# Patient Record
Sex: MALE | Race: WHITE | Employment: OTHER | URBAN - METROPOLITAN AREA
[De-identification: names, ages, dates, MRNs, and addresses within clinical notes are randomized per-mention and may not be internally consistent; named-entity substitution may affect disease eponyms.]

---

## 2018-03-26 ENCOUNTER — OFFICE VISIT (OUTPATIENT)
Dept: FAMILY MEDICINE CLINIC | Age: 73
End: 2018-03-26
Payer: MEDICARE

## 2018-03-26 VITALS
DIASTOLIC BLOOD PRESSURE: 78 MMHG | HEIGHT: 73 IN | SYSTOLIC BLOOD PRESSURE: 129 MMHG | TEMPERATURE: 97.8 F | BODY MASS INDEX: 26.03 KG/M2 | HEART RATE: 53 BPM | WEIGHT: 196.4 LBS

## 2018-03-26 DIAGNOSIS — M25.50 ARTHRALGIA, UNSPECIFIED JOINT: ICD-10-CM

## 2018-03-26 DIAGNOSIS — H91.92 DEAF, LEFT: ICD-10-CM

## 2018-03-26 DIAGNOSIS — E55.9 VITAMIN D DEFICIENCY: ICD-10-CM

## 2018-03-26 DIAGNOSIS — K59.01 SLOW TRANSIT CONSTIPATION: ICD-10-CM

## 2018-03-26 DIAGNOSIS — R00.0 TACHYCARDIA: ICD-10-CM

## 2018-03-26 DIAGNOSIS — R73.09 LOW GLUCOSE LEVEL: ICD-10-CM

## 2018-03-26 DIAGNOSIS — E78.5 ELEVATED LIPIDS: ICD-10-CM

## 2018-03-26 DIAGNOSIS — G47.19 EXCESSIVE DAYTIME SLEEPINESS: ICD-10-CM

## 2018-03-26 DIAGNOSIS — R53.83 TIRED: ICD-10-CM

## 2018-03-26 DIAGNOSIS — K90.89 OTHER INTESTINAL MALABSORPTION (CODE): ICD-10-CM

## 2018-03-26 DIAGNOSIS — R06.02 SHORT OF BREATH ON EXERTION: ICD-10-CM

## 2018-03-26 DIAGNOSIS — C61 PROSTATE CANCER (HCC): ICD-10-CM

## 2018-03-26 DIAGNOSIS — F41.9 ANXIETY: ICD-10-CM

## 2018-03-26 DIAGNOSIS — R00.1 BRADYCARDIA: Primary | ICD-10-CM

## 2018-03-26 PROCEDURE — G8419 CALC BMI OUT NRM PARAM NOF/U: HCPCS | Performed by: FAMILY MEDICINE

## 2018-03-26 PROCEDURE — 3017F COLORECTAL CA SCREEN DOC REV: CPT | Performed by: FAMILY MEDICINE

## 2018-03-26 PROCEDURE — 4040F PNEUMOC VAC/ADMIN/RCVD: CPT | Performed by: FAMILY MEDICINE

## 2018-03-26 PROCEDURE — G8484 FLU IMMUNIZE NO ADMIN: HCPCS | Performed by: FAMILY MEDICINE

## 2018-03-26 PROCEDURE — 1036F TOBACCO NON-USER: CPT | Performed by: FAMILY MEDICINE

## 2018-03-26 PROCEDURE — G8427 DOCREV CUR MEDS BY ELIG CLIN: HCPCS | Performed by: FAMILY MEDICINE

## 2018-03-26 PROCEDURE — 99204 OFFICE O/P NEW MOD 45 MIN: CPT | Performed by: FAMILY MEDICINE

## 2018-03-26 PROCEDURE — 1123F ACP DISCUSS/DSCN MKR DOCD: CPT | Performed by: FAMILY MEDICINE

## 2018-03-26 RX ORDER — ALPRAZOLAM 0.5 MG/1
0.5 TABLET ORAL PRN
COMMUNITY
Start: 2014-07-14 | End: 2018-06-25

## 2018-03-26 RX ORDER — CITALOPRAM 10 MG/1
40 TABLET ORAL DAILY
COMMUNITY
Start: 2014-07-15 | End: 2019-01-08

## 2018-03-26 RX ORDER — WARFARIN SODIUM 7.5 MG/1
7.5 TABLET ORAL DAILY
COMMUNITY

## 2018-03-26 NOTE — PROGRESS NOTES
Subjective:      Patient ID: Deniz Sharp is a 71 y.o. male. HPI   Deniz Sharp is a 71 y.o. White male. Milagros Klein  presents to the [address] today for   Chief Complaint   Patient presents with   Navneet Mejia New Doctor    Discuss Labs     care everywhere     Bradycardia    Prostate Cancer    Fatigue    Tachycardia    Photophobia    Insomnia    Anxiety     celexa but not working    Navneet Monreal Joint Pain     fingers     Shortness of Breath    Pressure Behind the Eyes    Hearing Problem     loss of left ear   ,  and;   1. Bradycardia    2. Tachycardia    3. Prostate cancer ([address]    4. Tired    5. Excessive daytime sleepiness    6. Anxiety    7. Arthralgia, unspecified joint    8. Deaf, left    9. Short of breath on exertion    10. Slow transit constipation    11. Other intestinal malabsorption (CODE)    12. Vitamin D deficiency    13. Elevated lipids    14. Low glucose level      I have reviewed Jigar Brian medical, surgical and other pertinent history in detail, and have updated medication and allergy information in the computerized patient record. Clinical Care Team:     -Referring Provider for today's consult: Self Referred  -Primary Care Provider: PA Generic    Medical/Surgical History:   He  has a past medical history of Abnormal electrocardiogram (ECG) (EKG); Anemia; Anxiety; Bradycardia; Cataract; Contusion; Depression; Disorder of iron metabolism; Disorder of vitamin B12; Dizziness; DVT (deep vein thrombosis) in pregnancy ([address] Elbow pain; Fatigue; Hyperlipidemia; Knee pain; Muscle weakness; Osteoarthritis; Premature ventricular contractions; Prostate cancer ([address] Raised prostate specific antigen; Ruptured tendon; Sudden hearing loss; and Thrombocytopenic disorder ([address] His  has a past surgical history that includes Appendectomy (2007); Colonoscopy; and Prostate biopsy (2017). Family/Social History:     His family history is not on file.   He  reports that he has Cancer    Fatigue    Tachycardia    Photophobia    Insomnia    Anxiety     celexa but not working    Ramon Albe Joint Pain     fingers     Shortness of Breath    Pressure Behind the Eyes    Hearing Problem     loss of left ear   ;    Plans for the next visits:  - Abnormal and non-optimal Labs were ordered today to be repeated in the next 120-365 days to assess changes from adjustments in nutrition and or nutrients. - Patient instructed when having a blood draw to ask the  to divide their lab draws into multiple draws over several days if not feeling good at the time of the lab draw or if either prefers to do several smaller blood draws over several days  - Patient instructed to check with insurer before each lab draw and to go to the lab which the insurer directs them for the most cost effective lab draw with the least patient's cost  - Sophia Mills  will be scheduled subsequent to those results. Ysabel Aguilar will bring in his drink and food log to his next visit    Chronic Problems Addressed on this Visit:                                   1.  Intensity of Service; Uncontrolled items at this visit; Chief Complaint   Patient presents with   Roberta Keller Doctor    Discuss Labs     care everywhere     Bradycardia    Prostate Cancer    Fatigue    Tachycardia    Photophobia    Insomnia    Anxiety     celexa but not working    Ramon Albe Joint Pain     fingers     Shortness of Breath    Pressure Behind the Eyes    Hearing Problem     loss of left ear   ;              Improved items reviewed at this visit; Stable items noted at this visit;  2. Patient's foods and drinks were reviewed with the patient,       Ysabel Aguilar will bring food+drink symptom log to next visit for inclusion in their record      - 75 better food list reviewed & given to patient with the omega 6 food list to avoid         - Gluten in corn and oats abstracts sheet reviewed and given to the patient today   3.    Greater than 45 minutes [email or web address]  search for latex

## 2018-03-27 LAB
CHOLESTEROL, TOTAL: 162 MG/DL
CHOLESTEROL/HDL RATIO: 3
HDLC SERPL-MCNC: 63 MG/DL (ref 35–70)
LDL CHOLESTEROL CALCULATED: 67 MG/DL (ref 0–160)
TRIGL SERPL-MCNC: 158 MG/DL
VLDLC SERPL CALC-MCNC: NORMAL MG/DL

## 2018-06-25 ENCOUNTER — OFFICE VISIT (OUTPATIENT)
Dept: FAMILY MEDICINE CLINIC | Age: 73
End: 2018-06-25
Payer: MEDICARE

## 2018-06-25 VITALS
SYSTOLIC BLOOD PRESSURE: 116 MMHG | BODY MASS INDEX: 25.53 KG/M2 | DIASTOLIC BLOOD PRESSURE: 73 MMHG | OXYGEN SATURATION: 98 % | RESPIRATION RATE: 12 BRPM | HEIGHT: 73 IN | HEART RATE: 57 BPM | WEIGHT: 192.6 LBS

## 2018-06-25 DIAGNOSIS — R00.0 TACHYCARDIA: ICD-10-CM

## 2018-06-25 DIAGNOSIS — H91.92 DEAF, LEFT: ICD-10-CM

## 2018-06-25 DIAGNOSIS — R06.02 SHORT OF BREATH ON EXERTION: ICD-10-CM

## 2018-06-25 DIAGNOSIS — R00.1 BRADYCARDIA: ICD-10-CM

## 2018-06-25 DIAGNOSIS — R73.09 LOW GLUCOSE LEVEL: ICD-10-CM

## 2018-06-25 DIAGNOSIS — E78.5 ELEVATED LIPIDS: ICD-10-CM

## 2018-06-25 DIAGNOSIS — G47.19 EXCESSIVE DAYTIME SLEEPINESS: ICD-10-CM

## 2018-06-25 DIAGNOSIS — C61 PROSTATE CANCER (HCC): ICD-10-CM

## 2018-06-25 DIAGNOSIS — K90.89 OTHER INTESTINAL MALABSORPTION (CODE): ICD-10-CM

## 2018-06-25 DIAGNOSIS — F41.9 ANXIETY: ICD-10-CM

## 2018-06-25 DIAGNOSIS — E55.9 VITAMIN D DEFICIENCY: ICD-10-CM

## 2018-06-25 DIAGNOSIS — K59.01 SLOW TRANSIT CONSTIPATION: ICD-10-CM

## 2018-06-25 DIAGNOSIS — R53.83 TIRED: ICD-10-CM

## 2018-06-25 DIAGNOSIS — M25.50 ARTHRALGIA, UNSPECIFIED JOINT: ICD-10-CM

## 2018-06-25 PROCEDURE — G8427 DOCREV CUR MEDS BY ELIG CLIN: HCPCS | Performed by: FAMILY MEDICINE

## 2018-06-25 PROCEDURE — G8419 CALC BMI OUT NRM PARAM NOF/U: HCPCS | Performed by: FAMILY MEDICINE

## 2018-06-25 PROCEDURE — 4040F PNEUMOC VAC/ADMIN/RCVD: CPT | Performed by: FAMILY MEDICINE

## 2018-06-25 PROCEDURE — 3017F COLORECTAL CA SCREEN DOC REV: CPT | Performed by: FAMILY MEDICINE

## 2018-06-25 PROCEDURE — 1036F TOBACCO NON-USER: CPT | Performed by: FAMILY MEDICINE

## 2018-06-25 PROCEDURE — 99214 OFFICE O/P EST MOD 30 MIN: CPT | Performed by: FAMILY MEDICINE

## 2018-06-25 PROCEDURE — 1123F ACP DISCUSS/DSCN MKR DOCD: CPT | Performed by: FAMILY MEDICINE

## 2018-06-25 ASSESSMENT — PATIENT HEALTH QUESTIONNAIRE - PHQ9
SUM OF ALL RESPONSES TO PHQ9 QUESTIONS 1 & 2: 0
SUM OF ALL RESPONSES TO PHQ9 QUESTIONS 1 & 2: 0
2. FEELING DOWN, DEPRESSED OR HOPELESS: 0
SUM OF ALL RESPONSES TO PHQ QUESTIONS 1-9: 0
2. FEELING DOWN, DEPRESSED OR HOPELESS: 0
1. LITTLE INTEREST OR PLEASURE IN DOING THINGS: 0
SUM OF ALL RESPONSES TO PHQ QUESTIONS 1-9: 0
1. LITTLE INTEREST OR PLEASURE IN DOING THINGS: 0

## 2018-06-25 ASSESSMENT — ENCOUNTER SYMPTOMS
ALLERGIC/IMMUNOLOGIC NEGATIVE: 1
RESPIRATORY NEGATIVE: 1
GASTROINTESTINAL NEGATIVE: 1

## 2019-01-02 ENCOUNTER — TELEPHONE (OUTPATIENT)
Dept: FAMILY MEDICINE CLINIC | Age: 74
End: 2019-01-02

## 2019-01-02 LAB
AVERAGE GLUCOSE: NORMAL
HBA1C MFR BLD: 5.5 %

## 2019-01-08 ENCOUNTER — OFFICE VISIT (OUTPATIENT)
Dept: FAMILY MEDICINE CLINIC | Age: 74
End: 2019-01-08
Payer: MEDICARE

## 2019-01-08 VITALS
OXYGEN SATURATION: 98 % | HEART RATE: 59 BPM | RESPIRATION RATE: 10 BRPM | BODY MASS INDEX: 25.05 KG/M2 | TEMPERATURE: 98.3 F | HEIGHT: 73 IN | WEIGHT: 189 LBS | DIASTOLIC BLOOD PRESSURE: 68 MMHG | SYSTOLIC BLOOD PRESSURE: 114 MMHG

## 2019-01-08 DIAGNOSIS — C61 PROSTATE CANCER (HCC): ICD-10-CM

## 2019-01-08 DIAGNOSIS — K90.89 OTHER INTESTINAL MALABSORPTION: ICD-10-CM

## 2019-01-08 DIAGNOSIS — R53.83 TIRED: ICD-10-CM

## 2019-01-08 DIAGNOSIS — H91.92 DEAF, LEFT: ICD-10-CM

## 2019-01-08 DIAGNOSIS — E55.9 VITAMIN D DEFICIENCY: ICD-10-CM

## 2019-01-08 DIAGNOSIS — E78.5 ELEVATED LIPIDS: ICD-10-CM

## 2019-01-08 DIAGNOSIS — M25.50 ARTHRALGIA, UNSPECIFIED JOINT: ICD-10-CM

## 2019-01-08 DIAGNOSIS — R06.02 SHORT OF BREATH ON EXERTION: ICD-10-CM

## 2019-01-08 DIAGNOSIS — R00.0 TACHYCARDIA: ICD-10-CM

## 2019-01-08 DIAGNOSIS — R73.09 LOW GLUCOSE LEVEL: ICD-10-CM

## 2019-01-08 DIAGNOSIS — R00.1 BRADYCARDIA: ICD-10-CM

## 2019-01-08 DIAGNOSIS — G47.19 EXCESSIVE DAYTIME SLEEPINESS: ICD-10-CM

## 2019-01-08 DIAGNOSIS — K59.01 SLOW TRANSIT CONSTIPATION: ICD-10-CM

## 2019-01-08 DIAGNOSIS — F41.9 ANXIETY: ICD-10-CM

## 2019-01-08 PROCEDURE — G8484 FLU IMMUNIZE NO ADMIN: HCPCS | Performed by: FAMILY MEDICINE

## 2019-01-08 PROCEDURE — 3017F COLORECTAL CA SCREEN DOC REV: CPT | Performed by: FAMILY MEDICINE

## 2019-01-08 PROCEDURE — 99214 OFFICE O/P EST MOD 30 MIN: CPT | Performed by: FAMILY MEDICINE

## 2019-01-08 PROCEDURE — 4040F PNEUMOC VAC/ADMIN/RCVD: CPT | Performed by: FAMILY MEDICINE

## 2019-01-08 PROCEDURE — 1101F PT FALLS ASSESS-DOCD LE1/YR: CPT | Performed by: FAMILY MEDICINE

## 2019-01-08 PROCEDURE — G8420 CALC BMI NORM PARAMETERS: HCPCS | Performed by: FAMILY MEDICINE

## 2019-01-08 PROCEDURE — G8427 DOCREV CUR MEDS BY ELIG CLIN: HCPCS | Performed by: FAMILY MEDICINE

## 2019-01-08 PROCEDURE — 1123F ACP DISCUSS/DSCN MKR DOCD: CPT | Performed by: FAMILY MEDICINE

## 2019-01-08 PROCEDURE — 1036F TOBACCO NON-USER: CPT | Performed by: FAMILY MEDICINE

## 2019-01-08 RX ORDER — CALCIUM CARBONATE 500(1250)
500 TABLET ORAL
COMMUNITY

## 2019-01-08 RX ORDER — MELATONIN 3 MG
1 TABLET ORAL
COMMUNITY

## 2019-01-08 RX ORDER — ALPRAZOLAM 0.5 MG/1
TABLET ORAL PRN
COMMUNITY

## 2019-01-08 RX ORDER — CHLORAL HYDRATE 500 MG
1000 CAPSULE ORAL DAILY
COMMUNITY

## 2019-10-07 ENCOUNTER — OFFICE VISIT (OUTPATIENT)
Dept: FAMILY MEDICINE CLINIC | Age: 74
End: 2019-10-07
Payer: MEDICARE

## 2019-10-07 VITALS
SYSTOLIC BLOOD PRESSURE: 114 MMHG | OXYGEN SATURATION: 98 % | HEIGHT: 73 IN | TEMPERATURE: 98.8 F | DIASTOLIC BLOOD PRESSURE: 82 MMHG | HEART RATE: 62 BPM | BODY MASS INDEX: 25.26 KG/M2 | WEIGHT: 190.6 LBS | RESPIRATION RATE: 10 BRPM

## 2019-10-07 DIAGNOSIS — F41.9 ANXIETY: ICD-10-CM

## 2019-10-07 DIAGNOSIS — C61 PROSTATE CANCER (HCC): ICD-10-CM

## 2019-10-07 DIAGNOSIS — R00.1 BRADYCARDIA: Primary | ICD-10-CM

## 2019-10-07 DIAGNOSIS — R06.02 SHORT OF BREATH ON EXERTION: ICD-10-CM

## 2019-10-07 DIAGNOSIS — K59.01 SLOW TRANSIT CONSTIPATION: ICD-10-CM

## 2019-10-07 DIAGNOSIS — G47.19 EXCESSIVE DAYTIME SLEEPINESS: ICD-10-CM

## 2019-10-07 DIAGNOSIS — R00.0 TACHYCARDIA: ICD-10-CM

## 2019-10-07 DIAGNOSIS — R53.83 TIRED: ICD-10-CM

## 2019-10-07 DIAGNOSIS — M25.50 ARTHRALGIA, UNSPECIFIED JOINT: ICD-10-CM

## 2019-10-07 PROCEDURE — 1036F TOBACCO NON-USER: CPT | Performed by: FAMILY MEDICINE

## 2019-10-07 PROCEDURE — 1123F ACP DISCUSS/DSCN MKR DOCD: CPT | Performed by: FAMILY MEDICINE

## 2019-10-07 PROCEDURE — 4040F PNEUMOC VAC/ADMIN/RCVD: CPT | Performed by: FAMILY MEDICINE

## 2019-10-07 PROCEDURE — 99214 OFFICE O/P EST MOD 30 MIN: CPT | Performed by: FAMILY MEDICINE

## 2019-10-07 PROCEDURE — 3017F COLORECTAL CA SCREEN DOC REV: CPT | Performed by: FAMILY MEDICINE

## 2019-10-07 PROCEDURE — G8427 DOCREV CUR MEDS BY ELIG CLIN: HCPCS | Performed by: FAMILY MEDICINE

## 2019-10-07 PROCEDURE — G8419 CALC BMI OUT NRM PARAM NOF/U: HCPCS | Performed by: FAMILY MEDICINE

## 2019-10-07 PROCEDURE — G8484 FLU IMMUNIZE NO ADMIN: HCPCS | Performed by: FAMILY MEDICINE

## 2019-10-07 ASSESSMENT — PATIENT HEALTH QUESTIONNAIRE - PHQ9
SUM OF ALL RESPONSES TO PHQ QUESTIONS 1-9: 0
SUM OF ALL RESPONSES TO PHQ QUESTIONS 1-9: 0
SUM OF ALL RESPONSES TO PHQ9 QUESTIONS 1 & 2: 0
1. LITTLE INTEREST OR PLEASURE IN DOING THINGS: 0
2. FEELING DOWN, DEPRESSED OR HOPELESS: 0

## 2019-10-07 ASSESSMENT — ENCOUNTER SYMPTOMS
ALLERGIC/IMMUNOLOGIC NEGATIVE: 1
EYES NEGATIVE: 1
RESPIRATORY NEGATIVE: 1
DIARRHEA: 1

## 2020-06-19 ENCOUNTER — TELEPHONE (OUTPATIENT)
Dept: FAMILY MEDICINE CLINIC | Age: 75
End: 2020-06-19

## 2020-06-19 NOTE — TELEPHONE ENCOUNTER
Pt is calling and misplaced the lab order that was given to him and he would like a copy mailed to him. He would also like an after visit summary from last visit sent along with that.   Please advise pt at [phone]

## 2020-06-26 NOTE — TELEPHONE ENCOUNTER
Pt called asking about the status of this request. Doesn't have an appt set at this time, but will call to schedule a VV when he gets labs

## 2020-07-01 NOTE — TELEPHONE ENCOUNTER
Called and lm that we can do a video visit tomorrow or so. Please call to schedule. Dr. Jaison Toussaint to review issues before he will do lab orders.

## 2020-07-13 ENCOUNTER — TELEMEDICINE (OUTPATIENT)
Dept: FAMILY MEDICINE CLINIC | Age: 75
End: 2020-07-13
Payer: MEDICARE

## 2020-07-13 PROCEDURE — 4040F PNEUMOC VAC/ADMIN/RCVD: CPT | Performed by: FAMILY MEDICINE

## 2020-07-13 PROCEDURE — 1123F ACP DISCUSS/DSCN MKR DOCD: CPT | Performed by: FAMILY MEDICINE

## 2020-07-13 PROCEDURE — 99214 OFFICE O/P EST MOD 30 MIN: CPT | Performed by: FAMILY MEDICINE

## 2020-07-13 PROCEDURE — 3017F COLORECTAL CA SCREEN DOC REV: CPT | Performed by: FAMILY MEDICINE

## 2020-07-13 PROCEDURE — G8427 DOCREV CUR MEDS BY ELIG CLIN: HCPCS | Performed by: FAMILY MEDICINE

## 2020-07-13 NOTE — PROGRESS NOTES
[id number] [facility] [address] [address]  Dept: [phone]  Dept Fax: [phone]  Loc: Geovany Fatima is a 69 y.o. White male. Alem Seals  presents to the [facility] today by doxy,me video visit which was performed via a 'synchronous telecommunication system and the Location of the Patient was in their Home, while the Location of the provider was in the provider's home for   Chief Complaint   Patient presents with    Discuss Labs    Fatigue    Bradycardia    Tachycardia    Generalized Body Aches    Joint Pain     hands playing guitar    Benign Prostatic Hypertrophy     cancer getting treatmenst    Neck Pain    Hyperlipidemia    Depression    Anxiety    Diarrhea     with urgency   ,  and;   1. Bradycardia    2. Anxiety    3. Tachycardia    4. Arthralgia, unspecified joint    5. Prostate cancer ([address]    6. Tired    7. Short of breath on exertion    8. Excessive daytime sleepiness    9. Slow transit constipation    10. Other intestinal malabsorption    11. Elevated lipids    12. Low glucose level      I have [facility], surgical and other pertinent history in detail, and have updated medication and allergy information in the computerized patientrecord.      Clinical Care Team:     -Referring Provider for today's consult: Self Referred  -Primary Care Provider: Noble Turner DO    Medical/Surgical History:   He  has a past medical history of Abnormal electrocardiogram (ECG) (EKG), Anemia, Anxiety, Bradycardia, Cataract, Contusion, Depression, Disorder of iron metabolism, Disorder of vitamin B12, Dizziness, DVT (deep vein thrombosis) in pregnancy, Elbow pain, Fatigue, Hyperlipidemia, Knee pain, Muscle weakness, Osteoarthritis, Premature ventricular contractions, Prostate cancer ([address] Raised prostate specific antigen, Ruptured tendon, Sudden hearing loss, and Thrombocytopenic disorder ([facility] His  has a past surgical history that includes Appendectomy (2007); Colonoscopy; and Prostate biopsy (2017). Family/Social History:     His family history is not on file. He  reports that he has never smoked. He has never used smokeless tobacco. He reports that he does not drink alcohol or use drugs. Medications/Allergies/Immunizations:     His current medication(s) include   Current Outpatient Medications:     NONFORMULARY, Take 1 capsule by mouth 4 times daily (before meals and nightly) [employer] at [employer]. com  Or amazon. com, Disp: , Rfl:     ALPRAZolam (XANAX) 0.5 MG tablet, as needed. ., Disp: , Rfl:     vitamin D (CHOLECALCIFEROL) 1000 UNIT TABS tablet, Take 5,000 Units by mouth daily , Disp: , Rfl:     Magnesium 100 MG CAPS, Take by mouth daily, Disp: , Rfl:     Omega-3 Fatty Acids (FISH OIL) 1000 MG CAPS, Take 1,000 mg by mouth daily cvs pharmaceutical grade, Disp: , Rfl:     DHEA 10 MG TABS, Take 1 tablet by mouth every morning (before breakfast) , Disp: , Rfl:     B Complex Vitamins (VITAMIN B COMPLEX PO), Take 1 tablet by mouth 3 times daily (before meals) [address] [facility] at [employer], Disp: , Rfl:     Multiple Vitamins-Minerals (CENTRUM SILVER 50+MEN) TABS, Take by mouth daily, Disp: , Rfl:     calcium carbonate (OSCAL) 500 MG TABS tablet, Take 500 mg by mouth 3 times daily (before meals) , Disp: , Rfl:     Multiple Minerals-Vitamins (BRANDON-MAG-ZINC-D PO), Take 1 tablet by mouth daily, Disp: , Rfl:     Sertraline HCl (ZOLOFT PO), Take 40 mg by mouth daily, Disp: , Rfl:     warfarin (COUMADIN) 7.5 MG tablet, Take 7.5 mg by mouth daily, Disp: , Rfl:   Allergies: Penicillins,  Immunizations:   Immunization History   Administered Date(s) Administered    Influenza A ([id number]) Vaccine PF IM 12/29/2009    Influenza Virus Vaccine 09/15/2018    Influenza Whole 11/05/2007, 10/22/2008    Influenza, High Dose (Fluzone 65 yrs and older) 10/01/2013, 10/11/2017, 11/15/2017    Pneumococcal Conjugate 13-valent ([id number]) 12/11/2017    Tdap (Boostrix, Adacel) 01/01/2012, 06/24/2012        History of PresentIllness:     Jigar's had concerns including Discuss Labs; Fatigue; Bradycardia; Tachycardia; Generalized Body Aches; Joint Pain (hands playing guitar); Benign Prostatic Hypertrophy (cancer getting treatmenst); Neck Pain; Hyperlipidemia; Depression; Anxiety; and Diarrhea (with urgency). Cheikh Shaver  presents to the [address] [place] [address] for;   Chief Complaint   Patient presents with    Discuss Labs    Fatigue    Bradycardia    Tachycardia    Generalized Body Aches    Joint Pain     hands playing guitar    Benign Prostatic Hypertrophy     cancer getting treatmenst    Neck Pain    Hyperlipidemia    Depression    Anxiety    Diarrhea     with urgency   , ,  abnormal labs follow up and these conditions as he  Is looking today for:     1. Bradycardia    2. Anxiety    3. Tachycardia    4. Arthralgia, unspecified joint    5. Prostate cancer ([address]    6. Tired    7. Short of breath on exertion    8. Excessive daytime sleepiness    9. Slow transit constipation    10. Other intestinal malabsorption    11. Elevated lipids    12. Low glucose level      HPI    Subjective:     Review of Systems   All other systems reviewed and are negative. Objective: There were no vitals taken for this visit. Physical Exam  Constitutional:       Appearance: Normal appearance. HENT:      Head: Normocephalic. Pulmonary:      Effort: Pulmonary effort is normal.   Neurological:      Mental Status: He is alert. Psychiatric:         Mood and Affect: Mood normal.         Thought Content:  Thought content normal.            Laboratory Data:   Lab results were searched in Care Everywhere and/or those brought by the pateint were reviewed today with Jigar and he has a copy of their most recent labs to take home with them as notedbelow;       Imaging Data:   Imaging Data:       Assessment & Plan:       Impression:  1. Bradycardia    2. Anxiety    3. Tachycardia    4. Arthralgia, unspecified joint    5. Prostate cancer ([address]    6. Tired    7. Short of breath on exertion    8. Excessive daytime sleepiness    9. Slow transit constipation    10. Other intestinal malabsorption    11. Elevated lipids    12. Low glucose level      Assessment and Plan:  After reviewing the patients chief complaints, reviewing their labfindings in great detail (with the patient and those accompanying them) which correlate to their chief complaints, symptoms, and or medical conditions; suggestions were made relating to changes in diet and or supplementswhich may improve the complaints and which will be reflected in their future lab findings; Chief Complaint   Patient presents with    Discuss Labs    Fatigue    Bradycardia    Tachycardia    Generalized Body Aches    Joint Pain     hands playing guitar    Benign Prostatic Hypertrophy     cancer getting treatmenst    Neck Pain    Hyperlipidemia    Depression    Anxiety    Diarrhea     with urgency   ;    Plans for the next visits:  - Abnormal and non-optimal Labs were ordered today to be repeated in the next 120-365 days to assess changes from adjustments in nutrition and or nutrients. - Patient instructed when having ablood draw to ask the  to divide their lab draws into multiple draws over several days if not feeling good at the time of the lab draw or if either prefers to do several smaller blood draws over several days  -Patient instructed to check with insurer before each lab draw and to to to the lab which the insurer directs them for the most cost effective lab draw with the least patient's cost  - Mxa Osuna  will be scheduled subsequentto those results.   Manohar Tasiaon will bring in his drink and food log to his next visit    Chronic Problems Addressed on this Visit:                                   1.  Intensity of supplements. If I have recommended cinnamon at the request of this patient to assist them in control of their blood sugar, triglyceride and or weight issues. I discussed that thepatient's clinical use of cinnamon bark, calcium, magnesium, Vitamin D and pharmaceutical grade [employer]S #725120 fish oil or triple-strength fish oil, and B-75 two phase time-released B complex by Lupe Harper will be for atime-limited trial to determine their individual effectiveness and safety in this patient. I also referred the patient to the NMCD: Nutrition, Metabolism, and Cardiovascular Diseases (journal) and concerns about long-termuse and hepatotoxicity of cinnamon and other nutrients and suggest they frequently search nih.gov for the latest non-proprietary information on nutriceuticals as well as consider a subscription to M:Metrics fordetails on reviewed supplements, or at the least review the nutrient files at [address] at [facility], [employer], an insulin mimetic, reduces some High Carbohydrate Dietary Impacts. Methylhydroxychalcone polymers insulin-enhancing properties in fat cells are responsible for enhanced glucose uptake, inhibiting hepatic HMG-CoA reductase and lowers lipids. www.jacn. org/content/20/4/327.full     But cinnamon with additivessuch as Cinnamon Extract are not effective as insulin mimetics.  :eStoreDirectory.at     Nutrients for Start up from [employer] or [employer] for ease to get started now ;  Brielle Andre has some useable products;  - Triple Strength Fish Oil, enteric coated  - Vit D 3 5000 IU gel caps  - Iron ferrous sulfat 325 mg tabs  - Centrum Silver look-a-like for most patients, or  - Centrum plain look-a-like if need iron    Localpharmacies or chains such as [employer], [employer], Wal-mart, have;  - Triple Strength Fish Oil (enteric coated ifavailable) or    If not enteric coated, can take from freezer for less burps  - B-50 or B-100 released balanced B complex tabs  - Cinnamon bark 500 mg (without Chromium or extracts)   some brands list 1000 mg / serving of 2 capsules,    some brands have 1000 mg caps with the undesireable chromium / extract  - Calcium carbonate/citrate, magnesium oxide/citrate, Vit D 3  as 3-4 tabs/caps/serving     Some Local Brands may contain Zincwhich is acceptable for the first bottle or two  - Magnesium oxide 250 mg tabs for those having < 2 bowel movements daily  - Magnesium citrate 200 mg if having > 2bowel movement/day  - Centrum Silver or look-a-like for most patients, Centrum plain or look-a-like with iron  - Vitamin D-3 comes as 1,000 IU or 2,000 IU or 5,000 IU gel caps or Liquid drops      Some brands containing or derived from soy oil or corn oil are OK if not allergic to soy  - Elemental Iron 65 mg tabsat bedtime is available over the counter if need more iron     Usually turns bowel movements grey, green or black but not a concern  - Apricot Kernel Oil (by Now) for dry skin sensitive perineal or perianal area skin    Nutrients for ongoing use by Mail order for less expense from [employer] ;  - Strength Fish Oil , 240 Softgels Item #[id number]  -B-100 time released balanced B complex Item #[id number]  - Cinnamon bark 500 mg without Chromium or extract Item #042200  - Calcium carbonate 1000 mg, Magnesium oxide 500 mg, Vit D 3  400 IU Item #[id number]  - Magnesium oxide 500 mg tabs Item #924097 if less than 2 bowel movements daily  - [employer] Sen[employer] Item #607826 for mostpatients, One Daily Item #078301 with iron  - Vit D 3  1,000 Item #784820      2,000 IU Item #166818  ,000 IU Item #599235     Some brands containing orderived from soy oil or corn oil are OK if not allergic to soy    Nutrients for Special Needs by Clau Sanchez for less expense from www. [employer]itan.com ;  -Elemental Iron 65 mg tabs Item #477179 if need more iron for low iron on labs    Usually turns bowel movements grey, green or black but not a concern  - Time released Niacin 250 mg Item #468290 for cold intolerance, low libido or impotence  - DHEA 50 mg Item #967180 for improving DHEA levels on labs if having Fatigue    If stools too loose substitute for your Magnesium oxide using;   Magnesium citrate 200 mg tabs(NOT liquid) at [employer]   Magnesium gluconate 550 mgby Hunter at [employer]. com or amazon. com  Magnesium chloride foot soaks or body sprays  www.[employer]   Magnesium chloride flakes 14.99 Item #: [id number] if Backordered get spray    Food Drink Symptom Log;  I asked this patient to track these items and any other symptoms on their list on a weekly basis to documenttheir progress or lack of same. This can be done on the symptom tracking sheet I gave them at today's visit but looks like this:                                                      Rate on scale of 0-10 with zero = notnoticeable  Symptom:                            Week 1               2                 3                 4               Etc            Hair loss    Foot cramps    Paresthesia    Aches    IBS (irritable bowel)    Constipation    Diarrhea  Nocturia    (up to bathroom at night)    Fatigue/Energy level  Stress      On the other side of the sheet they can track their food, drink, environment, activity, symptoms etc      Avoiding Latex-like proteins inmy foods; Avocados, Bananas, Celery, Figs & Kiwi proteins have latex-like proteins to inflame our immunesystems  How Can I Have A Latex Allergy? Eating foods with latex-like protein exposes us to latex allergies. Our body cannot tell the differencebetween these latex-like proteins and latex from rubber products since many people are allergic to fruit, vegetables and latex. Read labels on pre-packaged foods. This list to avoid is only a guide if you are known allergicto latex or have a latex rash on your chin, cheeks and lines on your neck and chest. The amount of latex is different in each food product or fruit variety.   to Avoid out of Season if not grown locally: Melon, Nectarine, Papaya, Cherry, Passion fruit, Plum, Chestnuts, and Tomato. Avocado, Banana, Celery, Figs, and Kiwi always contain Latex-like protein. Whats in Season? Strawberries taste better in June than December because June is strawberry season so buy locally grown produce \"in season\" for the best flavor, cost and less Latex. Locally grown produce notonly tastes great requires little of no ethylene exposure in food distribution so has less latex content. Out of season, use canned, frozen or dried sinceprocessed ripe and are latex lower!!!   Month     [place] LocallyGrown Produce  January, February, March: use canned, frozen or dried fruits since lower in latex  April; asparagus, radishes  May; asparagus, broccoli, green onions, greens, peas, radishes,rhubarb  June; asparagus, beets, beans, broccoli, cabbage, cantaloupe, carrots, green onions, greens, lettuce,onions, parsley, peas, radishes, rhubarb, strawberries, watermelons  July; beans, beets, blueberries,broccoli, cabbage, cantaloupe, carrots, cauliflower, celery, cucumbers, eggplant, grapes, green onions, greens, lettuce, onions, parsley, peas, peaches, bell peppers, potatoes, radishes, summer raspberries, squash, sweetcorn, tomatoes, turnips, watermelons  August; apples, beans, beets, blueberries, cabbage, cantaloupe, carrots,cauliflower, celery, cucumbers, eggplant, grapes, green onions, greens, lettuce, onions, parsley, peas, peaches, pears, bell peppers, potatoes, radishes, squash, sweet corn, tomatoes, turnips, watermelons  September; apples, beans, beets, blueberries, cabbage, cantaloupe, carrots, cauliflower, celery, cucumbers, eggplant, grapes,green onions, greens, lettuce, onions, parsley, peas, peaches, pears, bell peppers, plums, potatoes, pumpkins, radishes, fall red raspberries, squash, sweet corn, tomatoes, turnips, watermelons  October; apples, beets, broccoli, cabbage, carrots, cauliflower, celery, green onions, greens, lettuce, parsley, peas, pears, potatoes,pumpkins, radishes, fall red raspberries, squash, turnips  November; broccoli, cabbage, carrots, parsley,pears, peas  December: use canned, frozen ordried fruits since lower in latex    Upto half of latex-sensitive patients show allergic reactions to fruits (avocados, bananas, kiwifruits, papayas, peaches),   Annals of Allergy, 1994. These plants contain the same proteins that are allergens in latex. People with fruit allergies should warn physicians beforeundergoing procedures which may cause anaphylactic reaction if in contact with latex gloves. Some of the common foods with defined cross-reactivity to latexare avocado, banana, kiwi, chestnut, raw potato, tomato,stone fruits (e.g., peach, cherry), hazelnut, melons, celery, carrot, apple, pear, papaya, and almond. Foods with less well-defined cross-reactivity to latex are peanuts, peppers, citrus fruits, coconut, pineapple, anabela,fig, passion fruit, Ugli fruit, and grape    This fruit/latex cross-reactivity is worsened by ethylene, a gas used to hasten commercial ripening. In nature, plants produce low levels of the hormone ethylene, which regulates germination, flowering, and ripening. Forced ripening by high ethyleneconcentrations, plants produce allergenic wound-repair proteins, which are similar to wound-repair proteins made during the tapping of rubber trees. Sensitive individualswho ingest the fruit get a higher dose and worse reaction. Some people may even first become sensitized to latex through fruit. Can food processing increase theconcentrations of allergenic proteins? Latex-sensitized children (and adults) in [place] often experience allergic reactions after eating bananas ripenedartificially with ethylene.  In the [place], food distribution centers treat unripe bananas and other produce with ethylene to ripen; not commonly done in [facility] since fruit is tree-ripened there. Does treatmentof food with ethylene induce banana proteins that cross-react with latex? (Atul et al.    References:   Latex in Foods Allergy, http://ehp.niehs.nih.gov/members/2003/5811/5811.html    Search web for \" Whats in Season \" for whereyou live or are at the time you food shop  www.nutritioncouncil.org/pdf/healthy/SeasonalProduce. pdf ,   Management of Latex, ://medicalcenter. [facility].edu/  search for latex